# Patient Record
Sex: MALE | Race: WHITE | NOT HISPANIC OR LATINO | Employment: OTHER | URBAN - METROPOLITAN AREA
[De-identification: names, ages, dates, MRNs, and addresses within clinical notes are randomized per-mention and may not be internally consistent; named-entity substitution may affect disease eponyms.]

---

## 2023-01-17 ENCOUNTER — PATIENT MESSAGE (OUTPATIENT)
Dept: NEUROSURGERY | Facility: CLINIC | Age: 63
End: 2023-01-17
Payer: COMMERCIAL

## 2023-01-19 ENCOUNTER — OFFICE VISIT (OUTPATIENT)
Dept: NEUROSURGERY | Facility: CLINIC | Age: 63
End: 2023-01-19
Payer: OTHER GOVERNMENT

## 2023-01-19 VITALS
HEIGHT: 69 IN | WEIGHT: 121.5 LBS | SYSTOLIC BLOOD PRESSURE: 128 MMHG | DIASTOLIC BLOOD PRESSURE: 65 MMHG | BODY MASS INDEX: 18 KG/M2

## 2023-01-19 DIAGNOSIS — S12.201A CLOSED NONDISPLACED FRACTURE OF THIRD CERVICAL VERTEBRA, UNSPECIFIED FRACTURE MORPHOLOGY, INITIAL ENCOUNTER: Primary | ICD-10-CM

## 2023-01-19 PROCEDURE — 99203 OFFICE O/P NEW LOW 30 MIN: CPT | Performed by: NEUROLOGICAL SURGERY

## 2023-01-19 RX ORDER — INSULIN GLARGINE 100 [IU]/ML
INJECTION, SOLUTION SUBCUTANEOUS DAILY
COMMUNITY

## 2023-01-19 RX ORDER — LISINOPRIL AND HYDROCHLOROTHIAZIDE 20; 12.5 MG/1; MG/1
1 TABLET ORAL DAILY
COMMUNITY
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
## 2023-03-01 ENCOUNTER — TELEPHONE (OUTPATIENT)
Dept: NEUROSURGERY | Facility: CLINIC | Age: 63
End: 2023-03-01
Payer: COMMERCIAL

## 2023-03-01 NOTE — TELEPHONE ENCOUNTER
Left voicemail for patient to see if he has completed the x-rays ordered by Dr. Christensen at last visit.

## 2023-03-23 NOTE — TELEPHONE ENCOUNTER
[facility]ed [facility] to request recent imaging reports. Will forward to Dr. Christensen for review and advise patient.

## 2023-03-23 NOTE — TELEPHONE ENCOUNTER
Caller: Michelle Winkler    Relationship to patient: Self    Best call back number: [phone]    Patient is needing: PATIENT CALLED, PATIENT STATES HE COMPLETED AN [facility], [facility] SC[facility]. PATIENT STATES [facility]OULD NOT [facility]. PLEASE CALL PATIENT TO [facility].    THANK YOU

## 2023-03-24 NOTE — TELEPHONE ENCOUNTER
Left voicemail for patient. [facility] doesn't have any recent [facility]. Wanted to see if he wanted to complete the x-rays at [facility] or elsewhere, if [facility] can't complete [facility].

## 2023-03-27 NOTE — TELEPHONE ENCOUNTER
PT WANTS TO KNOW IF HE GOES TO  TO GET THE XR WILL IT BE BILLED TO THE [facility]? DOES THE OFFICE NEED TO REQUEST AUTH FOR THE XR?     PLEASE CALL PT BACK TO [facility] [phone]

## 2023-03-28 ENCOUNTER — HOSPITAL ENCOUNTER (OUTPATIENT)
Dept: GENERAL RADIOLOGY | Facility: HOSPITAL | Age: 63
Discharge: HOME | End: 2023-03-28
Admitting: NEUROLOGICAL SURGERY
Payer: OTHER GOVERNMENT

## 2023-03-28 ENCOUNTER — TRANSCRIBE ORDERS (OUTPATIENT)
Dept: ADMINISTRATIVE | Facility: HOSPITAL | Age: 63
End: 2023-03-28
Payer: COMMERCIAL

## 2023-03-28 DIAGNOSIS — S12.201A CLOSED NONDISPLACED FRACTURE OF THIRD CERVICAL VERTEBRA, UNSPECIFIED FRACTURE MORPHOLOGY, INITIAL ENCOUNTER: ICD-10-CM

## 2023-03-28 DIAGNOSIS — S12.201A CLOSED NONDISPLACED FRACTURE OF THIRD CERVICAL VERTEBRA, UNSPECIFIED FRACTURE MORPHOLOGY, INITIAL ENCOUNTER: Primary | ICD-10-CM

## 2023-03-28 PROCEDURE — 72052 X-RAY EXAM NECK SPINE 6/>VWS: CPT

## 2023-03-28 NOTE — TELEPHONE ENCOUNTER
Spoke to patient and let him know he could go to [facility] and [facility], he stated he will have them done today

## 2023-03-29 ENCOUNTER — TELEPHONE (OUTPATIENT)
Dept: NEUROSURGERY | Facility: CLINIC | Age: 63
End: 2023-03-29
Payer: COMMERCIAL

## 2023-03-29 NOTE — TELEPHONE ENCOUNTER
----- Message from Boby Christensen MD sent at 3/28/2023  3:03 PM EDT -----  No movement on the flex-ext x-rays. Lots of arthritis.

## 2023-09-13 ENCOUNTER — OFFICE VISIT (OUTPATIENT)
Dept: NEUROSURGERY | Facility: CLINIC | Age: 63
End: 2023-09-13
Payer: OTHER GOVERNMENT

## 2023-09-13 VITALS
DIASTOLIC BLOOD PRESSURE: 69 MMHG | BODY MASS INDEX: 27.34 KG/M2 | WEIGHT: 184.6 LBS | HEIGHT: 69 IN | HEART RATE: 87 BPM | SYSTOLIC BLOOD PRESSURE: 148 MMHG

## 2023-09-13 DIAGNOSIS — M48.061 SPINAL STENOSIS, LUMBAR REGION, WITHOUT NEUROGENIC CLAUDICATION: ICD-10-CM

## 2023-09-13 DIAGNOSIS — M16.11 ARTHRITIS OF RIGHT HIP: ICD-10-CM

## 2023-09-13 DIAGNOSIS — M25.551 RIGHT HIP PAIN: ICD-10-CM

## 2023-09-13 DIAGNOSIS — M47.817 SPONDYLOSIS OF LUMBOSACRAL REGION WITHOUT MYELOPATHY OR RADICULOPATHY: Primary | ICD-10-CM

## 2023-09-13 PROCEDURE — 99215 OFFICE O/P EST HI 40 MIN: CPT | Performed by: NURSE PRACTITIONER

## 2023-09-13 NOTE — PROGRESS NOTES
Chief Complaint  Back Pain (Patient had steriod shot in right hip yesterday. Patient is moving tomorrow, he is wanting MRI reviewed )    Subjective          Michelle Winkler who is a 62 y.o. year old male who presents to [facility] for evaluation of lumbar spine.      The patient complains of pain located in the lumbar spine.  He has a history of chronic back pain for many years, with exacerbations occurring every few years though only lasting a short duration and resolving independently. He recently had an exacerbation that has persisted to the point that he is no longer able to work. Patients states the pain has been present for 3 months.  The pain came on gradually.  His pain was primarily in the back and right hip. His pain progressed and he went to urgent care, and then ultimately went to the ED on August 11th when his pain increased severely while he was at work. He had a XR of the right hip demonstrating moderate to moderately severe degenerative changes at the hip joint. He recently saw Orthopedic and received a hip injection yesterday.     Associated Symptoms Include: Weakness. He felt that the right leg would go out on him. It takes him a moment to get the leg moving and he notably as a limp on the right. He has some numbness in the toes. He denies any radiating pain into the leg.     Conservative Interventions Include: Physical Therapy that was not very effective. He was prescribed Gabapentin and Diclofenac. At this point he is not having much in the way of pain. His main concern has been weakness and difficulty walking.     Was this the result of an injury or accident? : No    History of Previous Spinal Surgery?: No    Nicotine use: former smoker    BMI: Body mass index is 27.26 kg/m².      Review of Systems   Musculoskeletal:  Positive for arthralgias, back pain, gait problem and myalgias.   Neurological:  Positive for weakness.   Psychiatric/Behavioral:   "Positive for behavioral problems.    All other systems reviewed and are negative.     Objective   Vital Signs:   /69 (BP Location: Left arm, Patient Position: Sitting, Cuff Size: Adult)   Pulse 87   Ht 175.3 cm (69\")   Wt 83.7 kg (184 lb 9.6 oz)   BMI 27.26 kg/m²       Physical Exam  Vitals reviewed.   Constitutional:       Appearance: Normal appearance.   Musculoskeletal:      Lumbar back: Tenderness present. Negative right straight leg raise test and negative left straight leg raise test.      Right hip: Tenderness present. Decreased range of motion.      Left hip: No tenderness. Decreased range of motion.   Neurological:      Mental Status: He is alert and oriented to person, place, and time.      Motor: Motor strength is normal.      Deep Tendon Reflexes:      Reflex Scores:       Patellar reflexes are 1+ on the right side and 1+ on the left side.       Achilles reflexes are 1+ on the right side and 1+ on the left side.     Neurologic Exam     Mental Status   Oriented to person, place, and time.   Level of consciousness: alert    Motor Exam   Muscle bulk: normal  Overall muscle tone: normal    Strength   Strength 5/5 throughout.     Sensory Exam   Light touch normal.     Gait, Coordination, and Reflexes     Reflexes   Right patellar: 1+  Left patellar: 1+  Right achilles: 1+  Left achilles: 1+  Right ankle clonus: absent  Left ankle clonus: absentLimp on the right      Result Review :       Data reviewed : [facility]iologic studies [facility]. Congenital narrowing of the spinal canal. There is severe spinal canal stenosis asymmetric towards the right at L3/4 and moderately severe spinal canal stenosis at L4/5.        XR Right hip demonstrates moderate to severe degenerative changes in the hip joint.      Assessment and Plan    Diagnoses and all orders for this visit:    1. Spondylosis of lumbosacral region without myelopathy or radiculopathy (Primary)    2. Spinal " stenosis, lumbar region, without neurogenic claudication    3. Right hip pain    4. Arthritis of right hip    Pt presenting for evaluation of low back and right hip pain. His back pain has improved. However he has concerns of weakness in the right hip and leg which is making it impossible for him to work. We reviewed his MRI Lumbar Spine, demonstrating spondylosis with spinal stenosis at L3/4 and L4/5. There would be consideration for lumbar laminectomy at these levels. However he is not displaying radicular symptoms at this time and the weakness in the hip and leg is not consistent with a neurogenic claudication. He has arthritis in the right hip, and received a hip injection recently. I recommend following up with Orthopedics regarding the hip prior to consideration of spine surgery. He is planning on moving back home to [place] and will [facility].       I spent 40 minutes caring for Michelle on this date of service. This time includes time spent by me in the following activities:preparing for the visit, reviewing tests, obtaining and/or reviewing a separately obtained history, performing a medically appropriate examination and/or evaluation , counseling and educating the patient/family/caregiver, documenting information in the medical record, independently interpreting results and communicating that information with the patient/family/caregiver, and care coordination.    Follow Up   Return if symptoms worsen or fail to improve.  Patient was given instructions and counseling regarding his condition or for health maintenance advice.